# Patient Record
(demographics unavailable — no encounter records)

---

## 2024-10-18 NOTE — HISTORY OF PRESENT ILLNESS
[FreeTextEntry1] : doing well , no new pulmonary complaints told problems with antibodies  no sig dyspnea, no fever, chills, chest pain anxious about her thymus gland, was abnormal in past

## 2024-10-18 NOTE — CONSULT LETTER
[Dear  ___] : Dear  [unfilled], [Consult Letter:] : I had the pleasure of evaluating your patient, [unfilled]. [Please see my note below.] : Please see my note below. [Consult Closing:] : Thank you very much for allowing me to participate in the care of this patient.  If you have any questions, please do not hesitate to contact me. [Sincerely,] : Sincerely, [DrShweta  ___] : Dr. BAKER [FreeTextEntry3] : [facility] DO [facility]\par  Pulmonary Critical Care\par  Director Pulmonary Division\par  Medical Director Respiratory Therapy\par  [facility]\par  \par

## 2024-10-18 NOTE — DISCUSSION/SUMMARY
[FreeTextEntry1] : CT 3/23 Minute nodules stable x yrs , stable with mild residual thymus , minimal smoking hx No acute pulmonary complaints Distant spirometry super normal, exam without bronchospasm, normal sp02 Has calcified granuloma, denies any TB exposure, unclear hx ppd, will check quantiferon Bone density, mgmt per your care fro compression fractures Followed by Cardiology, rheumatology Will recheck CT chest for thymus - associated with multiple autoimmune syndromes vaccinations this fall 6  months or sooner if needed, will call with above

## 2024-10-18 NOTE — PHYSICAL EXAM
[General Appearance - Well Developed] : well developed [Normal Appearance] : normal appearance [General Appearance - Well Nourished] : well nourished [Normal Oropharynx] : normal oropharynx [II] : II [Neck Appearance] : the appearance of the neck was normal [Heart Rate And Rhythm] : heart rate and rhythm were normal [Heart Sounds] : normal S1 and S2 [Murmurs] : no murmurs present [Edema] : no peripheral edema present [Respiration, Rhythm And Depth] : normal respiratory rhythm and effort [Exaggerated Use Of Accessory Muscles For Inspiration] : no accessory muscle use [Auscultation Breath Sounds / Voice Sounds] : lungs were clear to auscultation bilaterally [Abnormal Walk] : normal gait [Nail Clubbing] : no clubbing of the fingernails [Cyanosis, Localized] : no localized cyanosis [] : no rash [No Focal Deficits] : no focal deficits [Oriented To Time, Place, And Person] : oriented to person, place, and time [Impaired Insight] : insight and judgment were intact [Affect] : the affect was normal [Memory Recent] : recent memory was not impaired [FreeTextEntry1] : no chest wall abn

## 2024-10-18 NOTE — REASON FOR VISIT
[Follow-Up] : a follow-up visit [Asthma] : asthma [Cough] : cough [FreeTextEntry2] : multiple nodules

## 2025-03-28 NOTE — PHYSICAL EXAM
[General Appearance - Well Nourished] : well nourished [General Appearance - Well Developed] : well developed [Sclera] : the sclera and conjunctiva were normal [Hearing Threshold Finger Rub Not Humphreys] : hearing was normal [] : no rash [Skin Lesions] : no skin lesions [Mood] : the mood was normal [Affect] : the affect was normal [Nail Clubbing] : no clubbing  or cyanosis of the fingernails [Musculoskeletal - Swelling] : no joint swelling seen [Motor Tone] : muscle strength and tone were normal

## 2025-03-28 NOTE — HISTORY OF PRESENT ILLNESS
[FreeTextEntry1] : previously followed by Dr kleiner and per patient he had dx her with RA   74 yo woman with history of HTN, HLD, thrombocytopenia, toxoplasmosis, 1 kidney, presents for Osteoporosis management.  Patient has a chronic compression fracture at T12 and C3-c7 with compression deformities.  Patient has been on prolia since sept 2020 ( given initially by her PMD.  last Prolia was 4/8/2024.    patient denies any parental hip fracture she has a ++ compression fracture NO RA  NO prednisone use NO endo abnormalities no s/s of malabsorption menses at age 15 and complete hysterectomy at age 50   9/2025:  patient carries a Dx of RA.  Patient states that she has 10-minute morning stiffness at the hips and knees. she also has many food allergies and is followed by allergist.  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  she has [place] tendon enthesophyte, and HIP OA.  3/28/2025: Patient carries a Dx of RA however she does have joint swelling and has 10 minute morning stiffness.  she has back pain involving bothe cervical and lumbar area that is chronic.  she has one kidney and refuses pain meds.  She has seen pain management who has offered injection but she declines injections.  she denies radicular pain at this time --patient has a history of allergies and has an allergist.  she has developed eczema and was seen by derm.  she occasionally uses hydrocortisone with relief.  derm repeated LIZET and this was negative  --OP last dexa 3/24/2023, she is onc tati and vit d.  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, and RF --will repeat these   she did labs  LIZET 1:160 RF 43  B2Gp IgM 92 ( no DVt/PE , preeclampsia, miscarriage )     denies any alopecia, oral lesions, persistant dry eye or dry mouth, red painful eye, nose bleeding, dysphagia, hoarseness, facial rashes, photosensitivity, sob, cough, cp, hx of serositis, hx low wbc, plts or anemia that required special treatment, Gi issues, raynauds, weakness, DVt/PE, miscarriages    denies psoriasis, nail changes, sauage digits, tennis elbow, de quervain, plantar facitis, achilles pain,  back pain, FH of psoriiasis, UC or crohns   labs:  LIZET 1:320 nuclear/dense fine speckled ( repeat 1:160)  RF 26  B2GP 92   normal DVRRT, ACL high IgE 578 CH50 hig normal c3/ c4  negative dsDNA, ro, la, CCP, sjogrens, leatha, centromeere, scl 70, ANCA, 14,3,3 normal TSH normal ESR/CRP  normla celiac antibodies  travel hx  hx of STds  PMH: as above Surgery: hysterectomy, breast and ovarian cyst resection  Allergy: NKDA  MEDs: per chart  FH: NC SH: lives with , retired HHA , no alcohol/smoking or illicit drugs  CCT 3/23/23: stable lung nodule , some calcified, stable T12 compression deformity

## 2025-03-28 NOTE — ASSESSMENT
[FreeTextEntry1] : 73 yo woman with history of HTN, HLD, stable pulmonary nodules, osteoporosis with compression fractures presents for OP management.  Patient is + LIZET 1:320 ,( most recent LIZET is negative)  RF 43 and B2GP 92.  Patient does not fulfill criteria for RA or APLS she does have tri compartment OA of the knees.  DJD of the hand and OA of the hips and cervical spondylosis, compression fracture annular fissure , stenosis   --patient is to do OP labs prior to next visit  --prolia order placed .prolia  last given 10/17/2025 --cont Vit d and calcium,  --encourage wt bearing exercise.   --will monitor for RA symptoms and APLS.  repeat RF and B2GP IgM

## 2025-04-21 NOTE — PROCEDURE
[Today's Date:] : Date: [unfilled] [Patient] : the patient [Consent Obtained] : written consent was obtained prior to the procedure and is detailed in the patient's record [Therapeutic] : therapeutic [#1 Site: ______] : #1 site identified in the [unfilled] [Alcohol] : alcohol [Tolerated Well] : the patient tolerated the procedure well [No Complications] : there were no complications [Instructions Given] : handouts/patient instructions were given to patient [Patient Instructed to Call] : patient was instructed to call if redness at site, a decrease in range of motion or an increase in pain is noted after procedure. [de-identified] : prolia syringe [de-identified] : prolia 60mg

## 2025-05-01 NOTE — DISCUSSION/SUMMARY
[FreeTextEntry1] : CT 10/24 Minute nodules stable x yrs , no thymus hyperplasia , minimal smoking hx No acute pulmonary complaints Distant spirometry super normal, exam without bronchospasm, normal sp02 Has calcified granuloma, denies any TB exposure, unclear hx ppd, will check quantiferon Bone density, mgmt per your care for compression fractures Followed by Cardiology, rheumatology No acute pulmonary c/o Will follow with allergist, also see Dr Villavicencio for solitary kidney 6  months or sooner if needed, will call with above, spirometry at f/u

## 2025-05-01 NOTE — CONSULT LETTER
[Dear  ___] : Dear  [unfilled], [Consult Letter:] : I had the pleasure of evaluating your patient, [unfilled]. [Please see my note below.] : Please see my note below. [Consult Closing:] : Thank you very much for allowing me to participate in the care of this patient.  If you have any questions, please do not hesitate to contact me. [Sincerely,] : Sincerely, [DrShweta  ___] : Dr. BAKER [FreeTextEntry3] : [facility] DO [facility]\par  Pulmonary Critical Care\par  Director Pulmonary Division\par  Medical Director Respiratory Therapy\par  [facility]\par  \par